# Patient Record
Sex: FEMALE | Race: BLACK OR AFRICAN AMERICAN | NOT HISPANIC OR LATINO | URBAN - NONMETROPOLITAN AREA
[De-identification: names, ages, dates, MRNs, and addresses within clinical notes are randomized per-mention and may not be internally consistent; named-entity substitution may affect disease eponyms.]

---

## 2023-08-02 ENCOUNTER — OFFICE (OUTPATIENT)
Dept: URBAN - NONMETROPOLITAN AREA CLINIC 1 | Facility: CLINIC | Age: 53
End: 2023-08-02

## 2023-08-02 VITALS
WEIGHT: 240 LBS | HEIGHT: 67 IN | SYSTOLIC BLOOD PRESSURE: 121 MMHG | DIASTOLIC BLOOD PRESSURE: 93 MMHG | HEART RATE: 76 BPM

## 2023-08-02 DIAGNOSIS — K21.9 GASTRO-ESOPHAGEAL REFLUX DISEASE WITHOUT ESOPHAGITIS: ICD-10-CM

## 2023-08-02 PROCEDURE — 99204 OFFICE O/P NEW MOD 45 MIN: CPT | Performed by: NURSE PRACTITIONER

## 2023-08-02 PROCEDURE — 36415 COLL VENOUS BLD VENIPUNCTURE: CPT | Performed by: NURSE PRACTITIONER

## 2023-08-02 RX ORDER — PANTOPRAZOLE SODIUM 20 MG/1
20 TABLET, DELAYED RELEASE ORAL
Qty: 30 | Refills: 11 | Status: ACTIVE
Start: 2023-08-02

## 2023-08-02 NOTE — SERVICENOTES
Risks for colonoscopy were explained to patient and she wishes to proceed. 
The bowel prep was prescribed and instructions were provided to her today.
Advised patient to avoid NSAIDs,  limit meloxicam if possible.

## 2023-08-02 NOTE — SERVICEHPINOTES
She comes in today to schedule a screening colonoscopy.  She has normal bowel movements.  No abdominal pain or blood with her stools.  Her sister has had colon polyps.  She has chronic GERD with  heartburn and acid coming up.    It is noted that she uses meloxicam.  She has a good appetite, no nausea, and no dark stools.  She has never used an H2 blocker or a PPI.  Her chronic illnesses include diabetes and hyperlipidemia.

## 2023-08-03 LAB
CBC, PLATELET, NO DIFFERENTIAL: HEMATOCRIT: 41 % (ref 34–46.6)
CBC, PLATELET, NO DIFFERENTIAL: HEMOGLOBIN: 13.4 G/DL (ref 11.1–15.9)
CBC, PLATELET, NO DIFFERENTIAL: MCH: 30.5 PG (ref 26.6–33)
CBC, PLATELET, NO DIFFERENTIAL: MCHC: 32.7 G/DL (ref 31.5–35.7)
CBC, PLATELET, NO DIFFERENTIAL: MCV: 93 FL (ref 79–97)
CBC, PLATELET, NO DIFFERENTIAL: NRBC: (no result)
CBC, PLATELET, NO DIFFERENTIAL: PLATELETS: 282 X10E3/UL (ref 150–450)
CBC, PLATELET, NO DIFFERENTIAL: RBC: 4.4 X10E6/UL (ref 3.77–5.28)
CBC, PLATELET, NO DIFFERENTIAL: RDW: 12.7 % (ref 11.7–15.4)
CBC, PLATELET, NO DIFFERENTIAL: WBC: 6.1 X10E3/UL (ref 3.4–10.8)
COMP. METABOLIC PANEL (14): A/G RATIO: 2 (ref 1.2–2.2)
COMP. METABOLIC PANEL (14): ALBUMIN: 4.7 G/DL (ref 3.8–4.9)
COMP. METABOLIC PANEL (14): ALKALINE PHOSPHATASE: 119 IU/L (ref 44–121)
COMP. METABOLIC PANEL (14): ALT (SGPT): 25 IU/L (ref 0–32)
COMP. METABOLIC PANEL (14): AST (SGOT): 27 IU/L (ref 0–40)
COMP. METABOLIC PANEL (14): BILIRUBIN, TOTAL: 0.3 MG/DL (ref 0–1.2)
COMP. METABOLIC PANEL (14): BUN/CREATININE RATIO: 10 (ref 9–23)
COMP. METABOLIC PANEL (14): BUN: 9 MG/DL (ref 6–24)
COMP. METABOLIC PANEL (14): CALCIUM: 10.2 MG/DL (ref 8.7–10.2)
COMP. METABOLIC PANEL (14): CARBON DIOXIDE, TOTAL: 24 MMOL/L (ref 20–29)
COMP. METABOLIC PANEL (14): CHLORIDE: 103 MMOL/L (ref 96–106)
COMP. METABOLIC PANEL (14): CREATININE: 0.89 MG/DL (ref 0.57–1)
COMP. METABOLIC PANEL (14): EGFR: 78 ML/MIN/1.73 (ref 59–?)
COMP. METABOLIC PANEL (14): GLOBULIN, TOTAL: 2.4 G/DL (ref 1.5–4.5)
COMP. METABOLIC PANEL (14): GLUCOSE: 89 MG/DL (ref 70–99)
COMP. METABOLIC PANEL (14): POTASSIUM: 4.7 MMOL/L (ref 3.5–5.2)
COMP. METABOLIC PANEL (14): PROTEIN, TOTAL: 7.1 G/DL (ref 6–8.5)
COMP. METABOLIC PANEL (14): SODIUM: 144 MMOL/L (ref 134–144)

## 2023-09-19 ENCOUNTER — ON CAMPUS - OUTPATIENT (OUTPATIENT)
Dept: URBAN - NONMETROPOLITAN AREA HOSPITAL 34 | Facility: HOSPITAL | Age: 53
End: 2023-09-19
Payer: COMMERCIAL

## 2023-09-19 DIAGNOSIS — D12.2 BENIGN NEOPLASM OF ASCENDING COLON: ICD-10-CM

## 2023-09-19 DIAGNOSIS — Z12.11 ENCOUNTER FOR SCREENING FOR MALIGNANT NEOPLASM OF COLON: ICD-10-CM

## 2023-09-19 PROCEDURE — 45385 COLONOSCOPY W/LESION REMOVAL: CPT | Mod: 33 | Performed by: INTERNAL MEDICINE
